# Patient Record
Sex: MALE | Race: WHITE
[De-identification: names, ages, dates, MRNs, and addresses within clinical notes are randomized per-mention and may not be internally consistent; named-entity substitution may affect disease eponyms.]

---

## 2020-06-01 ENCOUNTER — HOSPITAL ENCOUNTER (EMERGENCY)
Dept: HOSPITAL 53 - M ED | Age: 50
Discharge: HOME | End: 2020-06-01
Payer: COMMERCIAL

## 2020-06-01 ENCOUNTER — HOSPITAL ENCOUNTER (OUTPATIENT)
Dept: HOSPITAL 53 - M SDC | Age: 50
Discharge: HOME | End: 2020-06-01
Attending: SURGERY
Payer: OTHER GOVERNMENT

## 2020-06-01 VITALS — BODY MASS INDEX: 25.48 KG/M2 | WEIGHT: 178 LBS | HEIGHT: 70 IN

## 2020-06-01 VITALS — DIASTOLIC BLOOD PRESSURE: 73 MMHG | SYSTOLIC BLOOD PRESSURE: 129 MMHG

## 2020-06-01 VITALS — WEIGHT: 180.78 LBS | HEIGHT: 70 IN | BODY MASS INDEX: 25.88 KG/M2

## 2020-06-01 VITALS — DIASTOLIC BLOOD PRESSURE: 58 MMHG | SYSTOLIC BLOOD PRESSURE: 117 MMHG

## 2020-06-01 DIAGNOSIS — F17.210: ICD-10-CM

## 2020-06-01 DIAGNOSIS — R11.10: Primary | ICD-10-CM

## 2020-06-01 DIAGNOSIS — K40.90: Primary | ICD-10-CM

## 2020-06-01 DIAGNOSIS — Z91.040: ICD-10-CM

## 2020-06-01 DIAGNOSIS — Z98.890: ICD-10-CM

## 2020-06-01 DIAGNOSIS — Z79.899: ICD-10-CM

## 2020-06-01 DIAGNOSIS — E78.5: ICD-10-CM

## 2020-06-01 LAB
ALBUMIN SERPL BCG-MCNC: 3.4 GM/DL (ref 3.2–5.2)
ALT SERPL W P-5'-P-CCNC: 17 U/L (ref 12–78)
BASOPHILS # BLD AUTO: 0 10^3/UL (ref 0–0.2)
BASOPHILS NFR BLD AUTO: 0.2 % (ref 0–1)
BILIRUB CONJ SERPL-MCNC: 0.1 MG/DL (ref 0–0.2)
BILIRUB SERPL-MCNC: 0.4 MG/DL (ref 0.2–1)
BUN SERPL-MCNC: 13 MG/DL (ref 7–18)
CALCIUM SERPL-MCNC: 9 MG/DL (ref 8.5–10.1)
CHLORIDE SERPL-SCNC: 104 MEQ/L (ref 98–107)
CO2 SERPL-SCNC: 29 MEQ/L (ref 21–32)
CREAT SERPL-MCNC: 1.3 MG/DL (ref 0.7–1.3)
EOSINOPHIL # BLD AUTO: 0 10^3/UL (ref 0–0.5)
EOSINOPHIL NFR BLD AUTO: 0 % (ref 0–3)
GFR SERPL CREATININE-BSD FRML MDRD: > 60 ML/MIN/{1.73_M2} (ref 56–?)
GLUCOSE SERPL-MCNC: 130 MG/DL (ref 70–100)
HCT VFR BLD AUTO: 44.3 % (ref 42–52)
HGB BLD-MCNC: 15.2 G/DL (ref 13.5–17.5)
LIPASE SERPL-CCNC: 64 U/L (ref 73–393)
LYMPHOCYTES # BLD AUTO: 0.7 10^3/UL (ref 1.5–5)
LYMPHOCYTES NFR BLD AUTO: 5.3 % (ref 24–44)
MCH RBC QN AUTO: 31.3 PG (ref 27–33)
MCHC RBC AUTO-ENTMCNC: 34.3 G/DL (ref 32–36.5)
MCV RBC AUTO: 91.3 FL (ref 80–96)
MONOCYTES # BLD AUTO: 0.4 10^3/UL (ref 0–0.8)
MONOCYTES NFR BLD AUTO: 2.9 % (ref 0–5)
NEUTROPHILS # BLD AUTO: 11.4 10^3/UL (ref 1.5–8.5)
NEUTROPHILS NFR BLD AUTO: 90.8 % (ref 36–66)
PLATELET # BLD AUTO: 217 10^3/UL (ref 150–450)
POTASSIUM SERPL-SCNC: 4.2 MEQ/L (ref 3.5–5.1)
PROT SERPL-MCNC: 6.7 GM/DL (ref 6.4–8.2)
RBC # BLD AUTO: 4.85 10^6/UL (ref 4.3–6.1)
SODIUM SERPL-SCNC: 142 MEQ/L (ref 136–145)
WBC # BLD AUTO: 12.6 10^3/UL (ref 4–10)

## 2020-06-01 PROCEDURE — 74177 CT ABD & PELVIS W/CONTRAST: CPT

## 2020-06-01 PROCEDURE — 80048 BASIC METABOLIC PNL TOTAL CA: CPT

## 2020-06-01 PROCEDURE — 49650 LAP ING HERNIA REPAIR INIT: CPT

## 2020-06-01 PROCEDURE — 83690 ASSAY OF LIPASE: CPT

## 2020-06-01 PROCEDURE — 80076 HEPATIC FUNCTION PANEL: CPT

## 2020-06-01 PROCEDURE — 85025 COMPLETE CBC W/AUTO DIFF WBC: CPT

## 2020-06-01 PROCEDURE — 99284 EMERGENCY DEPT VISIT MOD MDM: CPT

## 2020-06-01 PROCEDURE — 93041 RHYTHM ECG TRACING: CPT

## 2020-06-01 RX ADMIN — FENTANYL CITRATE PRN MCG: 50 INJECTION, SOLUTION INTRAMUSCULAR; INTRAVENOUS at 09:22

## 2020-06-01 RX ADMIN — MORPHINE SULFATE PRN MG: 2 INJECTION, SOLUTION INTRAMUSCULAR; INTRAVENOUS at 09:49

## 2020-06-01 RX ADMIN — MORPHINE SULFATE PRN MG: 2 INJECTION, SOLUTION INTRAMUSCULAR; INTRAVENOUS at 09:37

## 2020-06-01 RX ADMIN — FENTANYL CITRATE PRN MCG: 50 INJECTION, SOLUTION INTRAMUSCULAR; INTRAVENOUS at 09:14

## 2020-06-01 RX ADMIN — FENTANYL CITRATE PRN MCG: 50 INJECTION, SOLUTION INTRAMUSCULAR; INTRAVENOUS at 09:07

## 2020-06-01 RX ADMIN — FENTANYL CITRATE PRN MCG: 50 INJECTION, SOLUTION INTRAMUSCULAR; INTRAVENOUS at 09:27

## 2020-06-01 NOTE — REPVR
PROCEDURE INFORMATION: 

Exam: CT Abdomen And Pelvis With Contrast 

Exam date and time: 6/1/2020 8:26 PM 

Age: 50 years old 

Clinical indication: Abdominal pain; Prior surgery; Surgery date: 

Post-operative (0-2 days); Additional info: Vomiting and pain S/P R lap 

inguinal hernia repair today 



TECHNIQUE: 

Imaging protocol: Computed tomography of the abdomen and pelvis with 

intravenous contrast. 

Radiation optimization: All CT scans at this facility use at least one of these 

dose optimization techniques: automated exposure control; mA and/or kV 

adjustment per patient size (includes targeted exams where dose is matched to 

clinical indication); or iterative reconstruction. 

Contrast material: ; Contrast volume: 100 ml; Contrast route: IV;  



COMPARISON: 

No relevant prior studies available. 



FINDINGS: 

Heart: No cardiomegaly or pericardial effusion. 

Lungs: There is mild dependent atelectasis in both lower lobes. The imaged 

portions of the lung bases are clear. The lungs were not fully imaged. 

Mediastinum: There is a small sliding hiatal hernia. There is fluid in the 

esophagus, which can be seen with gastroesophageal reflux. 

Diaphragm: Intact. 



Liver: The attenuation of the liver is lower compared to the spleen, which can 

be seen with fatty liver infiltration. No liver lesion is seen. The contour of 

the liver is smooth. No hepatomegaly is noted. 

Gallbladder and bile ducts: The gallbladder is distended. No calcified 

gallstones are seen. No gallbladder wall thickening, pericholecystic fluid, or 

pericholecystic inflammatory changes are identified. No dilation of the bile 

ducts is noted. No calcified stones are seen in the common bile duct. 

Pancreas: Normal. No dilation of the main pancreatic duct is noted. There is no 

inflammatory fat stranding around the pancreas to suggest acute pancreatitis. 

Spleen: Normal. No splenomegaly is noted. 

Adrenals: Normal. No adrenal mass is noted. 

Kidneys and ureters: There is a 12 mm simple cyst in the superior pole of the 

right kidney for which follow-up is not necessary. The kidneys are otherwise 

normal in appearance. No stones are noted in the kidneys or ureters. There is 

no hydronephrosis or hydroureter. There are no wedge-shaped areas of low 

attenuation in the kidneys to suggest pyelonephritis. There is no renal abscess 

or perinephric fluid collection. 

Stomach and bowel: There is mild colonic diverticulosis without evidence for 

diverticulitis. There is no evidence for a bowel obstruction, colitis, 

pneumatosis intestinalis, intussusception, or volvulus. No obvious 

discontinuity in the wall of the gastrointestinal tract is visualized. 

Appendix: Normal. There is no evidence for appendicitis. 



Intraperitoneal space: There is a small amount of intraperitoneal free air 

predominantly in the anterior aspect of the abdomen and pelvis and in the left 

side of the abdomen. No free fluid is noted. 

Retroperitoneal space: No fluid collection. No mass. 

Vasculature: The abdominal aorta is patent, normal in caliber, and there is no 

dissection. The iliac arteries, common femoral arteries, renal arteries, celiac 

artery, superior mesenteric artery, and inferior mesenteric artery are patent. 

Lymph nodes: No enlarged lymph nodes. 

Bladder: The distended urinary bladder is normal in appearance. No stones or 

masses are seen in the bladder. 

Reproductive: The prostate gland and seminal vesicles are unremarkable. 



Bones/joints: The imaged bony structures are intact. There is no suspicious 

osteolytic or osteoblastic lesion. There are degenerative changes involving the 

lumbar spine. 

Soft tissues: There is gas in the soft tissues in the anterior abdominal wall, 

anterior pelvic wall, and in the inguinal regions. No drainable soft tissue 

fluid collection or hernia is noted. There is a small amount of fluid in the 

right inguinal canal. 



IMPRESSION: 

1. Small amount of free air in the abdomen and pelvis, which may be 

postsurgical in nature. No obvious discontinuity identified in the wall of the 

gastrointestinal tract. 

2. Mild colonic diverticulosis without evidence for diverticulitis. 

3. Small sliding hiatal hernia and evidence for gastroesophageal reflux. 



Electronically signed by: Neville Spaulding On 06/01/2020  20:56:55 PM

## 2020-06-06 NOTE — RO
DATE OF PROCEDURE:  06/01/2020

 

PREOPERATIVE DIAGNOSIS:  Right inguinal hernia.

 

POSTOPERATIVE DIAGNOSIS:  Right inguinal hernia.

 

PROCEDURES:   Robotic right inguinal hernia repair.

 

SURGEON:  Dr. Rodrigo Mariscal.

 

ASSISTANT:  Lesly Nielson PA-C.

 

ANESTHESIA:  General

 

ESTIMATED BLOOD LOSS:  5 mL

 

COMPLICATIONS:  None.

 

INDICATIONS FOR PROCEDURE:  The patient is a 50-year-old male who presents with

right groin pain and found to have a right inguinal hernia.  Recommendation was

to proceed with robotic repair.  Risks and benefits of the procedure not limited

but including bleeding, infection, hernia recurrence, hernia formation, damage to

surrounding structure need for further surgery discussed in detail with the

patient.  Informed consent was obtained and the procedure was planned.

 

DESCRIPTION OF THE PROCEDURE: The patient brought back to the operating room.

After sufficient sedation, the abdomen sterilely prepped and draped.

 

Next a time-out was done to confirm proper patient and proper procedure.

Following that 8 mm incision made in left lower quadrant,  Veress needle was

inserted and was insufflated 2 mmHg.  Veress needle was then removed and an 8 mm

robotic OptiView port used to gain access the abdomen.  Once the abdomen was

entered two more 8 mm ports were placed across the midabdomen, one in the

midline, one on the right side.  Next the robot was docked to the ports.  Once

the arms were connected, the right groin was examined.  There was large amounts

of preperitoneal fat.  The peritoneum was incised with curved incision. The

preperitoneal space was dissected free using blunt and sharp dissection.  All

around the cord structures circumferentially, the peritoneal fat was carefully

dissected free posteriorly.  There were a couple cord lipomas extending down

through a small inguinal defect that also were dissected free and reduced.  The

dissection was carried medially all way to the pubic symphysis and posterior to

the cord structures.  Lying in between the cord structures and the iliac vessels

was of large lipoma very vascular very difficult to dissect so I left that in

place.  Once this was dissection was completed, the Bard 3D Max light- medium

mesh was placed into the preperitoneal space, sutured to the pubic symphysis with

#2-0 Vicryl suture.  The peritoneum was then closed over top of the mesh using a

running V-Loc suture.

 

Once that was completed the needles and a couple of small lipomas were removed

from the abdomen.  The was desufflated.  Skin incisions were closed with #4-0

Vicryl subcuticular sutures.  The abdomen was cleaned and dried.  Steri-Strips,

4x4 and tape were applied thus ending procedure.